# Patient Record
Sex: FEMALE | Race: WHITE | NOT HISPANIC OR LATINO
[De-identification: names, ages, dates, MRNs, and addresses within clinical notes are randomized per-mention and may not be internally consistent; named-entity substitution may affect disease eponyms.]

---

## 2022-04-04 ENCOUNTER — NON-APPOINTMENT (OUTPATIENT)
Age: 67
End: 2022-04-04

## 2022-04-04 PROBLEM — Z00.00 ENCOUNTER FOR PREVENTIVE HEALTH EXAMINATION: Status: ACTIVE | Noted: 2022-04-04

## 2022-04-11 ENCOUNTER — APPOINTMENT (OUTPATIENT)
Dept: ORTHOPEDIC SURGERY | Facility: HOSPITAL | Age: 67
End: 2022-04-11

## 2022-04-11 VITALS — HEIGHT: 68 IN | BODY MASS INDEX: 34.86 KG/M2 | WEIGHT: 230 LBS

## 2022-04-13 ENCOUNTER — APPOINTMENT (OUTPATIENT)
Dept: ORTHOPEDIC SURGERY | Facility: CLINIC | Age: 67
End: 2022-04-13

## 2022-04-27 ENCOUNTER — APPOINTMENT (OUTPATIENT)
Dept: ORTHOPEDIC SURGERY | Facility: CLINIC | Age: 67
End: 2022-04-27
Payer: COMMERCIAL

## 2022-04-27 VITALS — WEIGHT: 230 LBS | HEIGHT: 68 IN | BODY MASS INDEX: 34.86 KG/M2

## 2022-04-27 PROCEDURE — 73560 X-RAY EXAM OF KNEE 1 OR 2: CPT | Mod: RT

## 2022-04-27 RX ORDER — HYDROXYCHLOROQUINE SULFATE 400 MG/1
TABLET ORAL
Refills: 0 | Status: ACTIVE | COMMUNITY

## 2022-04-27 RX ORDER — BIMATOPROST 0.1 MG/ML
0.01 SOLUTION/ DROPS OPHTHALMIC
Refills: 0 | Status: ACTIVE | COMMUNITY

## 2022-04-27 NOTE — PHYSICAL EXAM
[NL (0)] : extension 0 degrees [Right] : right knee [AP] : anteroposterior [Lateral] : lateral [] : no purulent drainage [FreeTextEntry3] : prineo removed, wound benign [FreeTextEntry9] : the bony component of the patella has migrated proximally. the implant is well positioned, perhaps slightly low.

## 2022-04-27 NOTE — HISTORY OF PRESENT ILLNESS
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 Denies fevers, chills, SOB. Pain is 1/10. Denies pain meds. Wearing hinge brace.

## 2022-04-27 NOTE — ASSESSMENT
[FreeTextEntry1] : will continue in hinged rom brace. pt aggressively lifted leg in office to show her strength. recommed less is better at this point. will f/u in 2 weeks for repeat xray. daughter is a pta who was instructing  her to do quad/glute  sets at home.

## 2022-05-11 ENCOUNTER — APPOINTMENT (OUTPATIENT)
Dept: ORTHOPEDIC SURGERY | Facility: CLINIC | Age: 67
End: 2022-05-11
Payer: COMMERCIAL

## 2022-05-11 VITALS — WEIGHT: 230 LBS | HEIGHT: 68 IN | BODY MASS INDEX: 34.86 KG/M2

## 2022-05-11 DIAGNOSIS — Z86.39 PERSONAL HISTORY OF OTHER ENDOCRINE, NUTRITIONAL AND METABOLIC DISEASE: ICD-10-CM

## 2022-05-11 DIAGNOSIS — M32.9 SYSTEMIC LUPUS ERYTHEMATOSUS, UNSPECIFIED: ICD-10-CM

## 2022-05-11 DIAGNOSIS — Z78.9 OTHER SPECIFIED HEALTH STATUS: ICD-10-CM

## 2022-05-11 DIAGNOSIS — Z86.69 PERSONAL HISTORY OF OTHER DISEASES OF THE NERVOUS SYSTEM AND SENSE ORGANS: ICD-10-CM

## 2022-05-11 PROCEDURE — 99024 POSTOP FOLLOW-UP VISIT: CPT

## 2022-05-11 RX ORDER — BIMATOPROST 0.1 MG/ML
0.01 SOLUTION/ DROPS OPHTHALMIC
Refills: 0 | Status: ACTIVE | COMMUNITY

## 2022-05-11 RX ORDER — HYDROXYCHLOROQUINE SULFATE 200 MG/1
200 TABLET, FILM COATED ORAL
Refills: 0 | Status: ACTIVE | COMMUNITY

## 2022-05-11 RX ORDER — LEVOTHYROXINE SODIUM 0.09 MG/1
88 TABLET ORAL
Refills: 0 | Status: ACTIVE | COMMUNITY

## 2022-05-11 NOTE — HISTORY OF PRESENT ILLNESS
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 Pt presents today for a follow up on R TKA. Felling stronger, stopped walker. Still wearing brace. Taking pain Rx from [facility]. [FreeTextEntry1] : RT knee

## 2022-05-11 NOTE — PHYSICAL EXAM
[NL (0)] : extension 0 degrees [Right] : right knee [AP] : anteroposterior [Lateral] : lateral [] : no purulent drainage [FreeTextEntry9] : able to do slr, very well,

## 2022-05-16 ENCOUNTER — NON-APPOINTMENT (OUTPATIENT)
Age: 67
End: 2022-05-16

## 2022-05-25 ENCOUNTER — APPOINTMENT (OUTPATIENT)
Dept: ORTHOPEDIC SURGERY | Facility: CLINIC | Age: 67
End: 2022-05-25
Payer: COMMERCIAL

## 2022-05-25 DIAGNOSIS — M17.11 UNILATERAL PRIMARY OSTEOARTHRITIS, RIGHT KNEE: ICD-10-CM

## 2022-05-25 PROCEDURE — 99024 POSTOP FOLLOW-UP VISIT: CPT

## 2022-05-25 PROCEDURE — 73562 X-RAY EXAM OF KNEE 3: CPT | Mod: RT

## 2022-05-25 NOTE — HISTORY OF PRESENT ILLNESS
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 Doing well. Some pain getting comfortable in bed. Denies pain meds, taking NSAIDs daily, aspirin twice per day. Wearing hinge brace at 59. [FreeTextEntry1] : RT knee

## 2022-05-25 NOTE — ASSESSMENT
[FreeTextEntry1] : Patients hinged brace was adjusted to 40. Given PT Rx with instructions on adjusting brace each week. 60* after one week, 90* after another week and then unlocked after that week. Pt. may d/c aspirin at this time\par \par f/u 1 month

## 2022-05-25 NOTE — PHYSICAL EXAM
[Right] : right knee [NL (0)] : extension 0 degrees [5___] : hamstring 5[unfilled]/5 [] : no calf tenderness [AP] : anteroposterior [Lateral] : lateral [Components well fixed, in good position] : Components well fixed, in good position [FreeTextEntry9] : able to do slr, very well, passive flexion 45*

## 2022-06-22 ENCOUNTER — APPOINTMENT (OUTPATIENT)
Dept: ORTHOPEDIC SURGERY | Facility: CLINIC | Age: 67
End: 2022-06-22
Payer: COMMERCIAL

## 2022-06-22 VITALS — WEIGHT: 230 LBS | BODY MASS INDEX: 34.86 KG/M2 | HEIGHT: 68 IN

## 2022-06-22 DIAGNOSIS — S82.041D DISPLACED COMMINUTED FRACTURE OF RIGHT PATELLA, SUBSEQUENT ENCOUNTER FOR CLOSED FRACTURE WITH ROUTINE HEALING: ICD-10-CM

## 2022-06-22 DIAGNOSIS — Z96.651 PRESENCE OF RIGHT ARTIFICIAL KNEE JOINT: ICD-10-CM

## 2022-06-22 PROCEDURE — 73562 X-RAY EXAM OF KNEE 3: CPT | Mod: RT

## 2022-06-22 PROCEDURE — 99024 POSTOP FOLLOW-UP VISIT: CPT

## 2022-06-22 RX ORDER — ASPIRIN 325 MG/1
325 TABLET, FILM COATED ORAL
Qty: 28 | Refills: 0 | Status: ACTIVE | COMMUNITY
Start: 2022-04-11

## 2022-06-22 RX ORDER — ONDANSETRON 4 MG/1
4 TABLET, ORALLY DISINTEGRATING ORAL
Qty: 8 | Refills: 0 | Status: ACTIVE | COMMUNITY
Start: 2022-04-11

## 2022-06-22 RX ORDER — CELECOXIB 200 MG/1
200 CAPSULE ORAL
Qty: 30 | Refills: 0 | Status: ACTIVE | COMMUNITY
Start: 2021-12-23

## 2022-06-22 RX ORDER — PREGABALIN 50 MG/1
50 CAPSULE ORAL
Qty: 15 | Refills: 0 | Status: ACTIVE | COMMUNITY
Start: 2021-12-23

## 2022-06-22 RX ORDER — CEPHALEXIN 500 MG/1
500 CAPSULE ORAL
Qty: 8 | Refills: 0 | Status: ACTIVE | COMMUNITY
Start: 2022-04-11

## 2022-06-22 RX ORDER — OXYCODONE 5 MG/1
5 TABLET ORAL
Qty: 40 | Refills: 0 | Status: ACTIVE | COMMUNITY
Start: 2021-12-23

## 2022-07-13 ENCOUNTER — APPOINTMENT (OUTPATIENT)
Dept: ORTHOPEDIC SURGERY | Facility: CLINIC | Age: 67
End: 2022-07-13

## 2022-07-13 VITALS — HEIGHT: 68 IN | WEIGHT: 230 LBS | BODY MASS INDEX: 34.86 KG/M2

## 2022-07-13 DIAGNOSIS — M25.519 PAIN IN UNSPECIFIED SHOULDER: ICD-10-CM

## 2022-07-13 PROCEDURE — 99203 OFFICE O/P NEW LOW 30 MIN: CPT

## 2022-07-13 PROCEDURE — 99213 OFFICE O/P EST LOW 20 MIN: CPT

## 2022-07-13 NOTE — IMAGING
[de-identified] : RIGHT SHOULDER EXAM\par Skin intact\par No muscle atrophy noted\par Non-tender\par Shoulder ROM\par   Forward flexion to 140°; contralateral shoulder 160°\par   Abduction to 140°\par   ER with elbow at side to 35°; contralateral shoulder 45°\par   IR to L1\par \par + Lawanda empty can test\par + Hawkin’s impingement test\par - Speed's test\par - Apprehension test\par - Lift-off test\par - Cross-body adduction test\par \par Rotator cuff strength is 4/5 throughout\par Hand is warm and well perfused with brisk capillary refill throughout\par Motor function intact to axillary, AIN, PIN, and ulnar nerves\par Sensation intact axillary, median, ulnar, and superficial radial nerves\par Elbow and wrist motion intact and full\par Patient able to make a composite fist\par \par Right shoulder with a advanced glenohumeral arthrosis. No fracture nor dislocation.

## 2022-07-13 NOTE — HISTORY OF PRESENT ILLNESS
[de-identified] : 66F, RHD, PMHx of Hypothyroid, glaucoma, LUPUS, osteopenia presents with right shoulder pain. Reports having gel injections 1 year ago with Dr. Vargas, but since using a walker having difficulty with pain of the right shoulder. Would like to proceed with CSI today.

## 2022-07-13 NOTE — ASSESSMENT
[FreeTextEntry1] : Right shoulder glenohumeral arthrosis - reviewed radiographs and pathoanatomy with patient. Discussed management will consist of NSADIs prn, PT, CSI and athroplasty. Undergoing revision TKA in coming month, will hold off on CSI.\par \par F/u prn (for CSI)

## 2022-07-18 NOTE — ASSESSMENT
[FreeTextEntry1] : Options were discussed with patient. Patient was ref. to 2nd opinion, Dr. Heart,  for evaluation of the extensor mechanism of the right knee. \par \par Patient is encouraged to seek further evaluation of the knee.\par \par patient will also continue PT.\par \par Patient will cont. w brace and and can keep it unlocked. \par \par Will f/u with patient to discuss next steps \par

## 2022-07-18 NOTE — HISTORY OF PRESENT ILLNESS
[de-identified] : Follow up from Right Patella Replacement 4/11/22. Going to PT 2x a week and doing HEP. Slight improvement, she can bend her knee now. Takes tylenol prn and Rx Antiinflamatory from rheumatologist. She fell last week, they think her RT knee buckled. Fell straight back onto her butt.  Did not land on knee.  [FreeTextEntry1] : RT knee

## 2022-07-18 NOTE — PHYSICAL EXAM
[NL (0)] : extension 0 degrees [5___] : hamstring 5[unfilled]/5 [Components well fixed, in good position] : Components well fixed, in good position [Right] : right knee [AP] : anteroposterior [Lateral] : lateral [[place]] : skyline [Loose body] : Loose body [] : not able to do straight leg raise [FreeTextEntry8] : Palpable defect at quad tendon [FreeTextEntry9] : No significant changes from last visit, patellar baja noted

## 2022-08-17 RX ORDER — AMOXICILLIN 500 MG/1
500 TABLET, FILM COATED ORAL
Qty: 12 | Refills: 3 | Status: ACTIVE | COMMUNITY
Start: 2022-08-17 | End: 1900-01-01

## 2022-08-26 ENCOUNTER — OUTPATIENT (OUTPATIENT)
Dept: OUTPATIENT SERVICES | Facility: HOSPITAL | Age: 67
LOS: 1 days | End: 2022-08-26

## 2022-08-26 DIAGNOSIS — Z98.51 TUBAL LIGATION STATUS: Chronic | ICD-10-CM

## 2022-08-26 DIAGNOSIS — Z98.89 OTHER SPECIFIED POSTPROCEDURAL STATES: Chronic | ICD-10-CM

## 2022-08-26 PROCEDURE — 93010 ELECTROCARDIOGRAM REPORT: CPT

## 2022-08-30 ENCOUNTER — FORM ENCOUNTER (OUTPATIENT)
Age: 67
End: 2022-08-30

## 2022-09-05 DIAGNOSIS — M66.269 SPONTANEOUS RUPTURE OF EXTENSOR TENDONS, UNSPECIFIED LOWER LEG: ICD-10-CM

## 2022-09-05 DIAGNOSIS — Z01.810 ENCOUNTER FOR PREPROCEDURAL CARDIOVASCULAR EXAMINATION: ICD-10-CM

## 2022-09-05 DIAGNOSIS — Z01.812 ENCOUNTER FOR PREPROCEDURAL LABORATORY EXAMINATION: ICD-10-CM

## 2022-09-09 ENCOUNTER — OUTPATIENT (OUTPATIENT)
Dept: OUTPATIENT SERVICES | Facility: HOSPITAL | Age: 67
LOS: 1 days | End: 2022-09-09

## 2022-09-09 ENCOUNTER — INPATIENT (INPATIENT)
Facility: HOSPITAL | Age: 67
LOS: 3 days | Discharge: NURSING, REHAB OR LONG-TERM CARE | End: 2022-09-13

## 2022-09-09 DIAGNOSIS — Z98.89 OTHER SPECIFIED POSTPROCEDURAL STATES: Chronic | ICD-10-CM

## 2022-09-09 DIAGNOSIS — Z98.51 TUBAL LIGATION STATUS: Chronic | ICD-10-CM

## 2022-09-10 ENCOUNTER — OUTPATIENT (OUTPATIENT)
Dept: OUTPATIENT SERVICES | Facility: HOSPITAL | Age: 67
LOS: 1 days | End: 2022-09-10

## 2022-09-10 DIAGNOSIS — Z98.89 OTHER SPECIFIED POSTPROCEDURAL STATES: Chronic | ICD-10-CM

## 2022-09-10 DIAGNOSIS — Z98.51 TUBAL LIGATION STATUS: Chronic | ICD-10-CM

## 2022-09-11 ENCOUNTER — OUTPATIENT (OUTPATIENT)
Dept: OUTPATIENT SERVICES | Facility: HOSPITAL | Age: 67
LOS: 1 days | End: 2022-09-11

## 2022-09-11 DIAGNOSIS — Z98.89 OTHER SPECIFIED POSTPROCEDURAL STATES: Chronic | ICD-10-CM

## 2022-09-11 DIAGNOSIS — Z98.51 TUBAL LIGATION STATUS: Chronic | ICD-10-CM

## 2022-09-12 ENCOUNTER — OUTPATIENT (OUTPATIENT)
Dept: OUTPATIENT SERVICES | Facility: HOSPITAL | Age: 67
LOS: 1 days | End: 2022-09-12

## 2022-09-12 DIAGNOSIS — Z98.89 OTHER SPECIFIED POSTPROCEDURAL STATES: Chronic | ICD-10-CM

## 2022-09-12 DIAGNOSIS — Z98.51 TUBAL LIGATION STATUS: Chronic | ICD-10-CM

## 2022-09-12 PROCEDURE — 88300 SURGICAL PATH GROSS: CPT | Mod: 26

## 2022-09-13 ENCOUNTER — OUTPATIENT (OUTPATIENT)
Dept: OUTPATIENT SERVICES | Facility: HOSPITAL | Age: 67
LOS: 1 days | End: 2022-09-13

## 2022-09-13 ENCOUNTER — INPATIENT (INPATIENT)
Facility: HOSPITAL | Age: 67
LOS: 20 days | Discharge: HOME | End: 2022-10-04

## 2022-09-13 DIAGNOSIS — Z98.51 TUBAL LIGATION STATUS: Chronic | ICD-10-CM

## 2022-09-13 DIAGNOSIS — Z98.89 OTHER SPECIFIED POSTPROCEDURAL STATES: Chronic | ICD-10-CM

## 2022-09-19 DIAGNOSIS — M10.9 GOUT, UNSPECIFIED: ICD-10-CM

## 2022-09-19 DIAGNOSIS — M66.261 SPONTANEOUS RUPTURE OF EXTENSOR TENDONS, RIGHT LOWER LEG: ICD-10-CM

## 2022-09-19 DIAGNOSIS — M67.863: ICD-10-CM

## 2022-09-19 DIAGNOSIS — E03.9 HYPOTHYROIDISM, UNSPECIFIED: ICD-10-CM

## 2022-09-19 DIAGNOSIS — Z96.651 PRESENCE OF RIGHT ARTIFICIAL KNEE JOINT: ICD-10-CM

## 2022-09-19 DIAGNOSIS — M32.9 SYSTEMIC LUPUS ERYTHEMATOSUS, UNSPECIFIED: ICD-10-CM

## 2022-09-19 DIAGNOSIS — D62 ACUTE POSTHEMORRHAGIC ANEMIA: ICD-10-CM

## 2022-09-21 DIAGNOSIS — T84.318A BREAKDOWN (MECHANICAL) OF OTHER BONE DEVICES, IMPLANTS AND GRAFTS, INITIAL ENCOUNTER: ICD-10-CM

## 2022-09-21 DIAGNOSIS — M66.251 SPONTANEOUS RUPTURE OF EXTENSOR TENDONS, RIGHT THIGH: ICD-10-CM

## 2022-09-26 DIAGNOSIS — Z96.651 PRESENCE OF RIGHT ARTIFICIAL KNEE JOINT: ICD-10-CM

## 2022-09-26 DIAGNOSIS — Z47.1 AFTERCARE FOLLOWING JOINT REPLACEMENT SURGERY: ICD-10-CM

## 2022-09-26 DIAGNOSIS — M62.50 MUSCLE WASTING AND ATROPHY, NOT ELSEWHERE CLASSIFIED, UNSPECIFIED SITE: ICD-10-CM

## 2022-09-26 DIAGNOSIS — E03.9 HYPOTHYROIDISM, UNSPECIFIED: ICD-10-CM

## 2022-09-26 DIAGNOSIS — Z51.89 ENCOUNTER FOR OTHER SPECIFIED AFTERCARE: ICD-10-CM

## 2022-09-26 DIAGNOSIS — H40.9 UNSPECIFIED GLAUCOMA: ICD-10-CM

## 2022-09-26 DIAGNOSIS — R26.89 OTHER ABNORMALITIES OF GAIT AND MOBILITY: ICD-10-CM

## 2022-09-26 DIAGNOSIS — M32.9 SYSTEMIC LUPUS ERYTHEMATOSUS, UNSPECIFIED: ICD-10-CM

## 2022-09-29 DIAGNOSIS — M66.251 SPONTANEOUS RUPTURE OF EXTENSOR TENDONS, RIGHT THIGH: ICD-10-CM

## 2022-09-29 DIAGNOSIS — T84.318A BREAKDOWN (MECHANICAL) OF OTHER BONE DEVICES, IMPLANTS AND GRAFTS, INITIAL ENCOUNTER: ICD-10-CM

## 2022-10-05 DIAGNOSIS — E03.9 HYPOTHYROIDISM, UNSPECIFIED: ICD-10-CM

## 2022-10-05 DIAGNOSIS — Z96.651 PRESENCE OF RIGHT ARTIFICIAL KNEE JOINT: ICD-10-CM

## 2022-10-05 DIAGNOSIS — M32.9 SYSTEMIC LUPUS ERYTHEMATOSUS, UNSPECIFIED: ICD-10-CM

## 2022-10-08 DIAGNOSIS — Z96.651 PRESENCE OF RIGHT ARTIFICIAL KNEE JOINT: ICD-10-CM

## 2022-10-08 DIAGNOSIS — E11.9 TYPE 2 DIABETES MELLITUS WITHOUT COMPLICATIONS: ICD-10-CM

## 2022-10-08 DIAGNOSIS — E78.5 HYPERLIPIDEMIA, UNSPECIFIED: ICD-10-CM

## 2022-10-11 DIAGNOSIS — M32.9 SYSTEMIC LUPUS ERYTHEMATOSUS, UNSPECIFIED: ICD-10-CM

## 2022-10-11 DIAGNOSIS — Z96.651 PRESENCE OF RIGHT ARTIFICIAL KNEE JOINT: ICD-10-CM

## 2022-10-11 DIAGNOSIS — D62 ACUTE POSTHEMORRHAGIC ANEMIA: ICD-10-CM

## 2022-10-11 DIAGNOSIS — E03.9 HYPOTHYROIDISM, UNSPECIFIED: ICD-10-CM

## 2022-10-24 DIAGNOSIS — M32.9 SYSTEMIC LUPUS ERYTHEMATOSUS, UNSPECIFIED: ICD-10-CM

## 2022-10-24 DIAGNOSIS — Z96.651 PRESENCE OF RIGHT ARTIFICIAL KNEE JOINT: ICD-10-CM

## 2022-10-24 DIAGNOSIS — E03.9 HYPOTHYROIDISM, UNSPECIFIED: ICD-10-CM

## 2023-04-17 ENCOUNTER — APPOINTMENT (OUTPATIENT)
Dept: ORTHOPEDIC SURGERY | Facility: CLINIC | Age: 68
End: 2023-04-17
Payer: MEDICARE

## 2023-04-17 DIAGNOSIS — M19.011 PRIMARY OSTEOARTHRITIS, RIGHT SHOULDER: ICD-10-CM

## 2023-04-17 PROCEDURE — 99214 OFFICE O/P EST MOD 30 MIN: CPT | Mod: 25

## 2023-04-17 PROCEDURE — 20610 DRAIN/INJ JOINT/BURSA W/O US: CPT | Mod: RT

## 2023-04-17 NOTE — ASSESSMENT
[FreeTextEntry1] : Right shoulder glenohumeral arthrosis - reviewed radiographs and pathoanatomy with patient. Discussed management will consist of NSADIs prn, PT, CSI and arthroplasty. After discussion of risks/benefits, patient elected to proceed with CSI to right shoulder.\par \par Procedure 1 - 5.0cc 1%lidocaine + 1.0cc 40mg/cc Kenalog administered to right glenohumeral joint following sterilization with Betadine. Patient tolerated this well.

## 2023-04-17 NOTE — HISTORY OF PRESENT ILLNESS
[de-identified] : 66F, RHD, PMHx of Hypothyroid, glaucoma, LUPUS, osteopenia presents with right shoulder pain. Reports having gel injections 1 year ago with Dr. Vargas, but since using a walker having difficulty with pain of the right shoulder. Would like to proceed with CSI today. \par \par 4/17/23: f/u right shoulder. Reports having a lot of pain, denies numbness/tingling. Would like a CSI today if possible.

## 2023-04-17 NOTE — IMAGING
[de-identified] : RIGHT SHOULDER EXAM\par Skin intact\par No muscle atrophy noted\par Non-tender\par Shoulder ROM\par   Forward flexion to 140°; contralateral shoulder 160°\par   Abduction to 140°\par   ER with elbow at side to 35°; contralateral shoulder 45°\par   IR to L1\par \par + Lawanda empty can test\par + Hawkin’s impingement test\par - Speed's test\par - Apprehension test\par - Lift-off test\par - Cross-body adduction test\par \par Rotator cuff strength is 4/5 throughout\par Hand is warm and well perfused with brisk capillary refill throughout\par Motor function intact to axillary, AIN, PIN, and ulnar nerves\par Sensation intact axillary, median, ulnar, and superficial radial nerves\par Elbow and wrist motion intact and full\par Patient able to make a composite fist\par \par Right shoulder with a advanced glenohumeral arthrosis. No fracture nor dislocation.

## 2023-11-20 ENCOUNTER — OFFICE (OUTPATIENT)
Facility: LOCATION | Age: 68
Setting detail: OPHTHALMOLOGY
End: 2023-11-20
Payer: MEDICARE

## 2023-11-20 DIAGNOSIS — H35.372: ICD-10-CM

## 2023-11-20 DIAGNOSIS — H31.29: ICD-10-CM

## 2023-11-20 DIAGNOSIS — H35.363: ICD-10-CM

## 2023-11-20 DIAGNOSIS — Z79.899: ICD-10-CM

## 2023-11-20 DIAGNOSIS — Z96.1: ICD-10-CM

## 2023-11-20 DIAGNOSIS — H43.393: ICD-10-CM

## 2023-11-20 DIAGNOSIS — H35.443: ICD-10-CM

## 2023-11-20 DIAGNOSIS — H16.223: ICD-10-CM

## 2023-11-20 DIAGNOSIS — H18.833: ICD-10-CM

## 2023-11-20 DIAGNOSIS — H40.1131: ICD-10-CM

## 2023-11-20 PROCEDURE — 92014 COMPRE OPH EXAM EST PT 1/>: CPT | Performed by: OPTOMETRIST

## 2023-11-20 PROCEDURE — 92250 FUNDUS PHOTOGRAPHY W/I&R: CPT | Performed by: OPTOMETRIST

## 2023-11-20 ASSESSMENT — CONFRONTATIONAL VISUAL FIELD TEST (CVF)
OS_FINDINGS: FULL
OD_FINDINGS: FULL

## 2023-11-20 ASSESSMENT — SUPERFICIAL PUNCTATE KERATITIS (SPK)
OS_SPK: 1+
OD_SPK: 1+ 2+

## 2023-11-21 ENCOUNTER — RX ONLY (RX ONLY)
Age: 68
End: 2023-11-21

## 2023-11-21 ASSESSMENT — REFRACTION_CURRENTRX
OS_OVR_VA: 20/
OS_CYLINDER: +1.00
OD_OVR_VA: 20/
OS_AXIS: 162
OD_ADD: +2.25
OD_CYLINDER: +1.25
OD_AXIS: 0
OD_SPHERE: -0.50
OS_ADD: +2.25
OS_VPRISM_DIRECTION: PROGS
OS_SPHERE: -0.50
OD_VPRISM_DIRECTION: PROGS

## 2023-11-21 ASSESSMENT — SPHEQUIV_DERIVED
OS_SPHEQUIV: 0
OD_SPHEQUIV: 0.75

## 2023-11-21 ASSESSMENT — REFRACTION_AUTOREFRACTION
OS_CYLINDER: +1.00
OD_CYLINDER: +0.50
OD_SPHERE: +0.50
OD_AXIS: 002
OS_SPHERE: -0.50
OS_AXIS: 155

## 2024-03-18 ENCOUNTER — OFFICE (OUTPATIENT)
Facility: LOCATION | Age: 69
Setting detail: OPHTHALMOLOGY
End: 2024-03-18
Payer: MEDICARE

## 2024-03-18 DIAGNOSIS — H35.363: ICD-10-CM

## 2024-03-18 DIAGNOSIS — H40.1131: ICD-10-CM

## 2024-03-18 DIAGNOSIS — H31.29: ICD-10-CM

## 2024-03-18 DIAGNOSIS — H35.373: ICD-10-CM

## 2024-03-18 DIAGNOSIS — H18.833: ICD-10-CM

## 2024-03-18 DIAGNOSIS — Z96.1: ICD-10-CM

## 2024-03-18 DIAGNOSIS — H16.223: ICD-10-CM

## 2024-03-18 DIAGNOSIS — H43.813: ICD-10-CM

## 2024-03-18 DIAGNOSIS — Z79.899: ICD-10-CM

## 2024-03-18 DIAGNOSIS — H35.443: ICD-10-CM

## 2024-03-18 DIAGNOSIS — H43.393: ICD-10-CM

## 2024-03-18 PROCEDURE — 92083 EXTENDED VISUAL FIELD XM: CPT | Performed by: OPTOMETRIST

## 2024-03-18 PROCEDURE — 92134 CPTRZ OPH DX IMG PST SGM RTA: CPT | Performed by: OPTOMETRIST

## 2024-03-18 PROCEDURE — 92283 EXTND COLOR VISION XM: CPT | Performed by: OPTOMETRIST

## 2024-03-18 PROCEDURE — 92014 COMPRE OPH EXAM EST PT 1/>: CPT | Performed by: OPTOMETRIST

## 2024-03-18 ASSESSMENT — REFRACTION_CURRENTRX
OD_AXIS: 002
OS_OVR_VA: 20/
OS_CYLINDER: +1.00
OD_SPHERE: -0.50
OD_ADD: +2.25
OS_VPRISM_DIRECTION: PROGS
OD_VPRISM_DIRECTION: PROGS
OS_SPHERE: -0.50
OS_ADD: +2.25
OS_AXIS: 159
OD_CYLINDER: +1.25
OD_OVR_VA: 20/

## 2024-08-12 ENCOUNTER — APPOINTMENT (OUTPATIENT)
Dept: ORTHOPEDIC SURGERY | Facility: CLINIC | Age: 69
End: 2024-08-12
Payer: MEDICARE

## 2024-08-12 VITALS — BODY MASS INDEX: 33.34 KG/M2 | WEIGHT: 220 LBS | HEIGHT: 68 IN

## 2024-08-12 DIAGNOSIS — M25.519 PAIN IN UNSPECIFIED SHOULDER: ICD-10-CM

## 2024-08-12 PROCEDURE — 99213 OFFICE O/P EST LOW 20 MIN: CPT

## 2024-08-18 NOTE — IMAGING
[de-identified] : RIGHT SHOULDER EXAM\par  Skin intact\par  No muscle atrophy noted\par  Non-tender\par  Shoulder ROM\par    Forward flexion to 140; contralateral shoulder 160\par    Abduction to 140\par    ER with elbow at side to 35; contralateral shoulder 45\par    IR to L1\par  \par  + Lawanda empty can test\par  + Hawkin's impingement test\par  - Speed's test\par  - Apprehension test\par  - Lift-off test\par  - Cross-body adduction test\par  \par  Rotator cuff strength is 4/5 throughout\par  Hand is warm and well perfused with brisk capillary refill throughout\par  Motor function intact to axillary, AIN, PIN, and ulnar nerves\par  Sensation intact axillary, median, ulnar, and superficial radial nerves\par  Elbow and wrist motion intact and full\par  Patient able to make a composite fist\par  \par  Right shoulder with a advanced glenohumeral arthrosis. No fracture nor dislocation.

## 2024-08-18 NOTE — IMAGING
[de-identified] : RIGHT SHOULDER EXAM\par  Skin intact\par  No muscle atrophy noted\par  Non-tender\par  Shoulder ROM\par    Forward flexion to 140; contralateral shoulder 160\par    Abduction to 140\par    ER with elbow at side to 35; contralateral shoulder 45\par    IR to L1\par  \par  + Lawanda empty can test\par  + Hawkin's impingement test\par  - Speed's test\par  - Apprehension test\par  - Lift-off test\par  - Cross-body adduction test\par  \par  Rotator cuff strength is 4/5 throughout\par  Hand is warm and well perfused with brisk capillary refill throughout\par  Motor function intact to axillary, AIN, PIN, and ulnar nerves\par  Sensation intact axillary, median, ulnar, and superficial radial nerves\par  Elbow and wrist motion intact and full\par  Patient able to make a composite fist\par  \par  Right shoulder with a advanced glenohumeral arthrosis. No fracture nor dislocation.

## 2024-08-18 NOTE — HISTORY OF PRESENT ILLNESS
[de-identified] : 66F, RHD, PMHx of Hypothyroid, glaucoma, LUPUS, osteopenia presents with right shoulder pain. Reports having gel injections 1 year ago with Dr. Vargas, but since using a walker having difficulty with pain of the right shoulder. Would like to proceed with CSI today.   4/17/23: f/u right shoulder. Reports having a lot of pain, denies numbness/tingling. Would like a CSI today if possible.   08/12/24: f/u right shoulder. Patient reports that she is having pain, stating that it had woken her up from her sleep-in early August 2024. Patient reports pain with lifting her right arm. Denies numbness/tingling.

## 2024-08-18 NOTE — HISTORY OF PRESENT ILLNESS
[de-identified] : 66F, RHD, PMHx of Hypothyroid, glaucoma, LUPUS, osteopenia presents with right shoulder pain. Reports having gel injections 1 year ago with Dr. Vargas, but since using a walker having difficulty with pain of the right shoulder. Would like to proceed with CSI today.   4/17/23: f/u right shoulder. Reports having a lot of pain, denies numbness/tingling. Would like a CSI today if possible.   08/12/24: f/u right shoulder. Patient reports that she is having pain, stating that it had woken her up from her sleep-in early August 2024. Patient reports pain with lifting her right arm. Denies numbness/tingling.

## 2024-08-18 NOTE — ASSESSMENT
[FreeTextEntry1] : Right shoulder glenohumeral arthrosis - reviewed radiographs and pathoanatomy with patient. Discussed management will consist of NSADIs prn, PT, CSI and arthroplasty. Will pursue PT at this time.  F/u 2-3mo

## 2024-09-16 ENCOUNTER — OFFICE (OUTPATIENT)
Facility: LOCATION | Age: 69
Setting detail: OPHTHALMOLOGY
End: 2024-09-16
Payer: MEDICARE

## 2024-09-16 DIAGNOSIS — H18.833: ICD-10-CM

## 2024-09-16 DIAGNOSIS — H16.223: ICD-10-CM

## 2024-09-16 DIAGNOSIS — H43.813: ICD-10-CM

## 2024-09-16 DIAGNOSIS — H43.393: ICD-10-CM

## 2024-09-16 DIAGNOSIS — H40.1131: ICD-10-CM

## 2024-09-16 PROCEDURE — 92014 COMPRE OPH EXAM EST PT 1/>: CPT | Performed by: OPTOMETRIST

## 2024-09-16 PROCEDURE — 92250 FUNDUS PHOTOGRAPHY W/I&R: CPT | Performed by: OPTOMETRIST

## 2024-09-16 ASSESSMENT — CONFRONTATIONAL VISUAL FIELD TEST (CVF)
OD_FINDINGS: FULL
OS_FINDINGS: FULL

## 2024-12-16 ENCOUNTER — APPOINTMENT (OUTPATIENT)
Dept: ORTHOPEDIC SURGERY | Facility: CLINIC | Age: 69
End: 2024-12-16
Payer: MEDICARE

## 2024-12-16 VITALS — BODY MASS INDEX: 33.34 KG/M2 | WEIGHT: 220 LBS | HEIGHT: 68 IN

## 2024-12-16 DIAGNOSIS — M25.519 PAIN IN UNSPECIFIED SHOULDER: ICD-10-CM

## 2024-12-16 PROCEDURE — 99214 OFFICE O/P EST MOD 30 MIN: CPT | Mod: 25

## 2024-12-16 PROCEDURE — 20610 DRAIN/INJ JOINT/BURSA W/O US: CPT | Mod: RT

## 2025-01-09 ENCOUNTER — OFFICE (OUTPATIENT)
Facility: LOCATION | Age: 70
Setting detail: OPHTHALMOLOGY
End: 2025-01-09
Payer: MEDICARE

## 2025-01-09 DIAGNOSIS — H31.29: ICD-10-CM

## 2025-01-09 DIAGNOSIS — H40.1131: ICD-10-CM

## 2025-01-09 DIAGNOSIS — H43.393: ICD-10-CM

## 2025-01-09 DIAGNOSIS — H26.493: ICD-10-CM

## 2025-01-09 DIAGNOSIS — H16.223: ICD-10-CM

## 2025-01-09 PROCEDURE — 99213 OFFICE O/P EST LOW 20 MIN: CPT | Performed by: OPTOMETRIST

## 2025-01-09 PROCEDURE — 92283 EXTND COLOR VISION XM: CPT | Performed by: OPTOMETRIST

## 2025-01-09 PROCEDURE — 92133 CPTRZD OPH DX IMG PST SGM ON: CPT | Performed by: OPTOMETRIST

## 2025-01-09 PROCEDURE — 92083 EXTENDED VISUAL FIELD XM: CPT | Performed by: OPTOMETRIST

## 2025-01-09 ASSESSMENT — PACHYMETRY
OS_CT_CORRECTION: -2
OD_CT_CORRECTION: -2
OS_CT_UM: 575
OD_CT_UM: 579

## 2025-01-09 ASSESSMENT — TEAR BREAK UP TIME (TBUT)
OD_TBUT: T
OS_TBUT: T

## 2025-01-09 ASSESSMENT — TONOMETRY
OD_IOP_MMHG: 13
OS_IOP_MMHG: 16

## 2025-01-09 ASSESSMENT — SUPERFICIAL PUNCTATE KERATITIS (SPK)
OD_SPK: 1+ 2+
OS_SPK: 1+ 2+

## 2025-01-09 ASSESSMENT — CONFRONTATIONAL VISUAL FIELD TEST (CVF)
OS_FINDINGS: FULL
OD_FINDINGS: FULL

## 2025-01-10 ASSESSMENT — REFRACTION_CURRENTRX
OD_SPHERE: +0.50
OS_AXIS: 165
OD_AXIS: 172
OS_SPHERE: -0.25
OD_ADD: +2.50
OD_CYLINDER: +0.50
OS_CYLINDER: +1.00
OS_ADD: +2.50
OS_VPRISM_DIRECTION: PROGS
OD_VPRISM_DIRECTION: PROGS
OS_OVR_VA: 20/
OD_OVR_VA: 20/

## 2025-01-10 ASSESSMENT — VISUAL ACUITY
OD_BCVA: 20/30-2
OS_BCVA: 20/25-1

## 2025-01-10 ASSESSMENT — REFRACTION_AUTOREFRACTION
OS_AXIS: 174
OD_AXIS: 178
OS_CYLINDER: +0.75
OD_SPHERE: +0.25
OD_CYLINDER: +0.75
OS_SPHERE: -0.50

## 2025-01-10 ASSESSMENT — KERATOMETRY
OD_K1POWER_DIOPTERS: 46.25
OD_K2POWER_DIOPTERS: 46.75
OD_AXISANGLE_DEGREES: 081
OS_K2POWER_DIOPTERS: 43.75
OS_AXISANGLE_DEGREES: 141
OS_K1POWER_DIOPTERS: 43.25

## 2025-05-19 ENCOUNTER — OFFICE (OUTPATIENT)
Facility: LOCATION | Age: 70
Setting detail: OPHTHALMOLOGY
End: 2025-05-19
Payer: MEDICARE

## 2025-05-19 DIAGNOSIS — H40.1131: ICD-10-CM

## 2025-05-19 DIAGNOSIS — H35.373: ICD-10-CM

## 2025-05-19 DIAGNOSIS — H16.223: ICD-10-CM

## 2025-05-19 DIAGNOSIS — H26.493: ICD-10-CM

## 2025-05-19 PROCEDURE — 92250 FUNDUS PHOTOGRAPHY W/I&R: CPT | Performed by: OPTOMETRIST

## 2025-05-19 PROCEDURE — 92014 COMPRE OPH EXAM EST PT 1/>: CPT | Performed by: OPTOMETRIST

## 2025-05-19 ASSESSMENT — PACHYMETRY
OS_CT_CORRECTION: -2
OD_CT_CORRECTION: -2
OS_CT_UM: 575
OD_CT_UM: 579

## 2025-05-19 ASSESSMENT — REFRACTION_CURRENTRX
OS_AXIS: 159
OS_SPHERE: -0.25
OS_VPRISM_DIRECTION: PROGS
OD_CYLINDER: +0.75
OD_VPRISM_DIRECTION: PROGS
OS_OVR_VA: 20/
OD_OVR_VA: 20/
OS_ADD: +2.50
OS_CYLINDER: +1.25
OD_ADD: +2.50
OD_SPHERE: +0.50
OD_AXIS: 161

## 2025-05-19 ASSESSMENT — REFRACTION_AUTOREFRACTION
OS_SPHERE: -0.50
OS_CYLINDER: +1.25
OD_SPHERE: 0.00
OD_AXIS: 176
OD_CYLINDER: +1.00
OS_AXIS: 169

## 2025-05-19 ASSESSMENT — VISUAL ACUITY
OS_BCVA: 20/20
OD_BCVA: 20/25-2

## 2025-05-19 ASSESSMENT — KERATOMETRY
OD_K2POWER_DIOPTERS: 45.25
OS_K1POWER_DIOPTERS: U/A
OD_K1POWER_DIOPTERS: 45.25

## 2025-05-19 ASSESSMENT — TONOMETRY
OD_IOP_MMHG: 17
OS_IOP_MMHG: 17

## 2025-05-19 ASSESSMENT — TEAR BREAK UP TIME (TBUT)
OD_TBUT: T
OS_TBUT: T

## 2025-05-19 ASSESSMENT — CONFRONTATIONAL VISUAL FIELD TEST (CVF)
OD_FINDINGS: FULL
OS_FINDINGS: FULL

## 2025-05-19 ASSESSMENT — SUPERFICIAL PUNCTATE KERATITIS (SPK)
OD_SPK: 1+ 2+
OS_SPK: 1+ 2+